# Patient Record
(demographics unavailable — no encounter records)

---

## 2025-02-21 NOTE — END OF VISIT
[] : Fellow [FreeTextEntry3] : 71 yo M with p16+ OPSCC s/p CCRT with carbo/taxol here for f/u. He is offering no new complaints. He saw  in march for direct visualization, BOBO on exam. Patient deferring labs today. He complains of shoulder pain. refer him to ortophedic surgery. Refer to new nephrologist, .

## 2025-02-21 NOTE — END OF VISIT
[] : Fellow [FreeTextEntry3] : 73 yo M with p16+ OPSCC s/p CCRT with carbo/taxol here for f/u. He is offering no new complaints. He saw  in march for direct visualization, BOBO on exam. Patient deferring labs today. He complains of shoulder pain. refer him to ortophedic surgery. Refer to new nephrologist, .

## 2025-02-27 NOTE — REVIEW OF SYSTEMS
[Joint Pain] : joint pain [Negative] : Allergic/Immunologic [Fever] : no fever [Chills] : no chills [Night Sweats] : no night sweats [Fatigue] : no fatigue [Recent Change In Weight] : ~T no recent weight change [Dysphagia] : no dysphagia [Odynophagia] : no odynophagia [Mucosal Pain] : no mucosal pain [Chest Pain] : no chest pain [Palpitations] : no palpitations [Lower Ext Edema] : no lower extremity edema [Shortness Of Breath] : no shortness of breath [Cough] : no cough [SOB on Exertion] : no shortness of breath during exertion [Abdominal Pain] : no abdominal pain [Vomiting] : no vomiting [Constipation] : no constipation [Dysuria] : no dysuria [Skin Rash] : no skin rash [Dizziness] : no dizziness [Fainting] : no fainting [FreeTextEntry9] : Right shoulder pain

## 2025-02-27 NOTE — HISTORY OF PRESENT ILLNESS
[Disease: _____________________] : Disease: [unfilled] [T: ___] : T[unfilled] [N: ___] : N[unfilled] [AJCC Stage: ____] : AJCC Stage: [unfilled] [Treatment Protocol] : Treatment Protocol [Therapy: ___] : Therapy: [unfilled] [100: Normal, no complaints, no evidence of disease.] : 100: Normal, no complaints, no evidence of disease. [ECOG Performance Status: 0 - Fully active, able to carry on all pre-disease performance without restriction] : Performance Status: 0 - Fully active, able to carry on all pre-disease performance without restriction [de-identified] : 72 yo M with stage ELZA oropharyngeal SCC p16 positive completed CCRT (carbo/Taxol) on 11/1/22, with PET-CT (01/2023) showing BOBO, here for follow up.   Onc hx: He presented for evaluation of a base of tongue mass and also palpable cervical node to  office on 7/5/2022. He states he is noticed this area is bothering him he is having some difficulty swallowing and wished to have it evaluated. He underwent laryngoscopy same day, and biopsies were obtained, path c/w invasive squamous cell carcinoma, nonkeratinizing type, p16 positive. He then underwent an FNA of cervical LN, level 2 on L and level 3 on R, both positive for SCC. MRI neck showing T4N2 disease. PET scan w/o distant mets. Started chemoRT on 9/13, with weekly Paclitaxel/carboplatin, completed 11/1/22.  7/5/2022: Tongue, lesion; biopsy: Final Diagnosis - Invasive squamous cell carcinoma, nonkeratinizing type, p16 positive - See Note.  Note: Immunohistochemical staining on block 1A shows the neoplastic cells to be positive for p63 and p16, while negative for synaptophysin and chromogranin.  7/13/2022: Fine Needle Aspiration Report - Auth (Verified) 1. NECK, LEVEL 2, LEFT, FNA 2. NECK, LEVEL 3, RIGHT, FNA  Final Diagnosis 1. NECK, LEVEL 2, LEFT, FNA POSITIVE FOR MALIGNANT CELLS. Metastatic Squamous Cell Carcinoma compatible with known history of nonkeratinizing squamous cell carcinoma of tongue  Cytology slide and cell block section show a hypercellular specimen composed of syncytial sheets and numerous single squamoid cells with irregular, hyperchromatic nuclei and dense cytoplasm in a background of necrosis and keratinized debris. History of non keratinizing squmaous carcinoma of tongue is noted.  2. NECK, LEVEL 3, RIGHT, FNA POSITIVE FOR MALIGNANT CELLS. Metastatic Squamous Cell Carcinoma compatible with known history of nonkeratinizing squamous cell carcinoma of tongue Cytology slide and cell block section show rare syncytial sheets of squamoid cells with irregular, hyperchromatic nuclei and dense cytoplasm in a background of  non viable cellular debris. History of nonkeratinizing squmaous carcinoma of tongue is noted.  1/20/2023 PET/CT: No definite evidence of residual disease. Increased uptake throughout the tongue and mild swelling of the left base of tongue and palatine tonsil, most likely representing posttreatment inflammatory changes. Interval resolution of hypermetabolic cervical lymphadenopathy.  7/25/23 PET/CT 1. Since 1/20/2023, there is a new 3.2 cm focus of intense increased activity at the left base of the tongue. This is suspicious for recurrent cancer. Recommend direct visualization and biopsy. 2. Mild increased activity associated with a few centrilobular groundglass opacities in the right lower lobe. This probably represents aspiration. Infection could have a similar appearance.  8/4/23: MRI Neck:  Within limitations of the study: No mass identified within the oral cavity/oropharynx to correspond with PET/CT findings. No new cervical lymphadenopathy.  2/1/2024: PET: Activity in the posterior portion of the floor of the mouth appears to be due to muscle uptake rather than recurrent tumor.  The activity is much more uniform than seen on the prior study.  While the exact reason for this uptake is unclear, it is associated with similarly increased activity throughout the head and neck region, a common finding after treatment of head and neck tumors. The abnormal FDG uptake in the right lung seen on prior study is no longer present. [de-identified] : Invasive squamous cell carcinoma, nonkeratinizing type, p16 positive\par   positive  [de-identified] : Radonc - ,  at [facility]\par  ENT - Dr. Caceres\par  Gen surgery:  [FreeTextEntry1] : 9/13/22 - carboplatin/paclitaxel C1D1 9/20/22 - carboplatin/paclitaxel C1D8 9/27/2022 carboplatin/paclitaxel C1D15 10/4/22 carboplatin/paclitaxel C1D22 10/11/22  carboplatin/paclitaxel C1D29 10/18/22  carboplatin/paclitaxel C1D36 10/25/22 carboplatin/paclitaxel C1D43 11/01/22 carboplatin/paclitaxel C1D50 [de-identified] : Here for 6 month follow up.  He offers no complaints except that he had an MRI neck at [facility] 2 weeks ago, was told he is cancer free.  A week after that he felt a lump like sensation in the left part of his neck, which is gone now.  He states he has been getting blood work with Dr. Batres at [facility].

## 2025-02-27 NOTE — HISTORY OF PRESENT ILLNESS
[Disease: _____________________] : Disease: [unfilled] [T: ___] : T[unfilled] [N: ___] : N[unfilled] [AJCC Stage: ____] : AJCC Stage: [unfilled] [Treatment Protocol] : Treatment Protocol [Therapy: ___] : Therapy: [unfilled] [100: Normal, no complaints, no evidence of disease.] : 100: Normal, no complaints, no evidence of disease. [ECOG Performance Status: 0 - Fully active, able to carry on all pre-disease performance without restriction] : Performance Status: 0 - Fully active, able to carry on all pre-disease performance without restriction [de-identified] : 74 yo M with stage ELZA oropharyngeal SCC p16 positive completed CCRT (carbo/Taxol) on 11/1/22, with PET-CT (01/2023) showing BOBO, here for follow up.   Onc hx: He presented for evaluation of a base of tongue mass and also palpable cervical node to  office on 7/5/2022. He states he is noticed this area is bothering him he is having some difficulty swallowing and wished to have it evaluated. He underwent laryngoscopy same day, and biopsies were obtained, path c/w invasive squamous cell carcinoma, nonkeratinizing type, p16 positive. He then underwent an FNA of cervical LN, level 2 on L and level 3 on R, both positive for SCC. MRI neck showing T4N2 disease. PET scan w/o distant mets. Started chemoRT on 9/13, with weekly Paclitaxel/carboplatin, completed 11/1/22.  7/5/2022: Tongue, lesion; biopsy: Final Diagnosis - Invasive squamous cell carcinoma, nonkeratinizing type, p16 positive - See Note.  Note: Immunohistochemical staining on block 1A shows the neoplastic cells to be positive for p63 and p16, while negative for synaptophysin and chromogranin.  7/13/2022: Fine Needle Aspiration Report - Auth (Verified) 1. NECK, LEVEL 2, LEFT, FNA 2. NECK, LEVEL 3, RIGHT, FNA  Final Diagnosis 1. NECK, LEVEL 2, LEFT, FNA POSITIVE FOR MALIGNANT CELLS. Metastatic Squamous Cell Carcinoma compatible with known history of nonkeratinizing squamous cell carcinoma of tongue  Cytology slide and cell block section show a hypercellular specimen composed of syncytial sheets and numerous single squamoid cells with irregular, hyperchromatic nuclei and dense cytoplasm in a background of necrosis and keratinized debris. History of non keratinizing squmaous carcinoma of tongue is noted.  2. NECK, LEVEL 3, RIGHT, FNA POSITIVE FOR MALIGNANT CELLS. Metastatic Squamous Cell Carcinoma compatible with known history of nonkeratinizing squamous cell carcinoma of tongue Cytology slide and cell block section show rare syncytial sheets of squamoid cells with irregular, hyperchromatic nuclei and dense cytoplasm in a background of  non viable cellular debris. History of nonkeratinizing squmaous carcinoma of tongue is noted.  1/20/2023 PET/CT: No definite evidence of residual disease. Increased uptake throughout the tongue and mild swelling of the left base of tongue and palatine tonsil, most likely representing posttreatment inflammatory changes. Interval resolution of hypermetabolic cervical lymphadenopathy.  7/25/23 PET/CT 1. Since 1/20/2023, there is a new 3.2 cm focus of intense increased activity at the left base of the tongue. This is suspicious for recurrent cancer. Recommend direct visualization and biopsy. 2. Mild increased activity associated with a few centrilobular groundglass opacities in the right lower lobe. This probably represents aspiration. Infection could have a similar appearance.  8/4/23: MRI Neck:  Within limitations of the study: No mass identified within the oral cavity/oropharynx to correspond with PET/CT findings. No new cervical lymphadenopathy.  2/1/2024: PET: Activity in the posterior portion of the floor of the mouth appears to be due to muscle uptake rather than recurrent tumor.  The activity is much more uniform than seen on the prior study.  While the exact reason for this uptake is unclear, it is associated with similarly increased activity throughout the head and neck region, a common finding after treatment of head and neck tumors. The abnormal FDG uptake in the right lung seen on prior study is no longer present. [de-identified] : Invasive squamous cell carcinoma, nonkeratinizing type, p16 positive\par   positive  [de-identified] : Radonc - ,  at [facility]\par  ENT - Dr. Caceres\par  Gen surgery:  [FreeTextEntry1] : 9/13/22 - carboplatin/paclitaxel C1D1 9/20/22 - carboplatin/paclitaxel C1D8 9/27/2022 carboplatin/paclitaxel C1D15 10/4/22 carboplatin/paclitaxel C1D22 10/11/22  carboplatin/paclitaxel C1D29 10/18/22  carboplatin/paclitaxel C1D36 10/25/22 carboplatin/paclitaxel C1D43 11/01/22 carboplatin/paclitaxel C1D50 [de-identified] : Here for 6 month follow up.  He offers no complaints except that he had an MRI neck at [facility] 2 weeks ago, was told he is cancer free.  A week after that he felt a lump like sensation in the left part of his neck, which is gone now.  He states he has been getting blood work with Dr. Batres at [facility].

## 2025-02-27 NOTE — PHYSICAL EXAM
[Fully active, able to carry on all pre-disease performance without restriction] : Status 0 - Fully active, able to carry on all pre-disease performance without restriction [Normal] : affect appropriate [Ulcers] : no ulcers [Mucositis] : no mucositis [de-identified] : No visible gum bleeding or ulcers. Normal posterior oropharynx. [de-identified] : No palpable cervical lymphadenopathy, slight rigidity, but no masses visible or palpable [de-identified] : Trace pitting bilateral ankle edema. No calf or popliteal tenderness. [de-identified] : Point tenderness at right acromion. No visible deformity, erythema, or ecchymosis. Active/passive ROM restricted in abduction and extension. Full sensation in RUE, distal pulses intact.  strength intact. [de-identified] : Tangential thought.

## 2025-02-27 NOTE — ASSESSMENT
[FreeTextEntry1] : 74 yo M with stage ELZA (T4N2) oropharyngeal SCC, p16+ here today for f/u (completed CCRT carbo/taxol on 11/1/2022) with PET-CT (01/20/23) showing BOBO. Recent PET scan 7/5/23 with evidence of possible recurrent disease, though follow up MRI ordered by ENT, does not show evidence of mass.  Oropharyngeal SCC, T4N2 - stage ELZA s/p concurrent therapy with weekly carboplatin + paclitaxel (cisplatin deferred due to CKD), originally planned for 7 doses per trial with advanced unresectable SCCHN cancer, completed 11/1/22. PET 2/1/24 = BOBO No further imaging planned unless something macroscopically present on exam --labs every 6 months: CBC, CMP, TSH (pt deferring labs today as he had them done recently with  - results  not available) --c/w follow with rad onc Dr. Burgess at [facility] for direct visualization --will obtain records from [facility]: recent MRI, notes and labs --RTC with  in 6 months unless acute issues arise

## 2025-02-27 NOTE — PHYSICAL EXAM
[Fully active, able to carry on all pre-disease performance without restriction] : Status 0 - Fully active, able to carry on all pre-disease performance without restriction [Normal] : affect appropriate [Ulcers] : no ulcers [Mucositis] : no mucositis [de-identified] : No visible gum bleeding or ulcers. Normal posterior oropharynx. [de-identified] : No palpable cervical lymphadenopathy, slight rigidity, but no masses visible or palpable [de-identified] : Trace pitting bilateral ankle edema. No calf or popliteal tenderness. [de-identified] : Point tenderness at right acromion. No visible deformity, erythema, or ecchymosis. Active/passive ROM restricted in abduction and extension. Full sensation in RUE, distal pulses intact.  strength intact. [de-identified] : Tangential thought.

## 2025-02-27 NOTE — ASSESSMENT
[FreeTextEntry1] : 72 yo M with stage ELZA (T4N2) oropharyngeal SCC, p16+ here today for f/u (completed CCRT carbo/taxol on 11/1/2022) with PET-CT (01/20/23) showing BOBO. Recent PET scan 7/5/23 with evidence of possible recurrent disease, though follow up MRI ordered by ENT, does not show evidence of mass.  Oropharyngeal SCC, T4N2 - stage ELZA s/p concurrent therapy with weekly carboplatin + paclitaxel (cisplatin deferred due to CKD), originally planned for 7 doses per trial with advanced unresectable SCCHN cancer, completed 11/1/22. PET 2/1/24 = BOBO No further imaging planned unless something macroscopically present on exam --labs every 6 months: CBC, CMP, TSH (pt deferring labs today as he had them done recently with  - results  not available) --c/w follow with rad onc Dr. Burgess at [facility] for direct visualization --will obtain records from [facility]: recent MRI, notes and labs --RTC with  in 6 months unless acute issues arise